# Patient Record
Sex: FEMALE | Race: WHITE | NOT HISPANIC OR LATINO | URBAN - METROPOLITAN AREA
[De-identification: names, ages, dates, MRNs, and addresses within clinical notes are randomized per-mention and may not be internally consistent; named-entity substitution may affect disease eponyms.]

---

## 2019-05-06 ENCOUNTER — IMPORTED ENCOUNTER (OUTPATIENT)
Dept: URBAN - METROPOLITAN AREA CLINIC 50 | Facility: CLINIC | Age: 49
End: 2019-05-06

## 2019-05-10 ENCOUNTER — IMPORTED ENCOUNTER (OUTPATIENT)
Dept: URBAN - METROPOLITAN AREA CLINIC 50 | Facility: CLINIC | Age: 49
End: 2019-05-10

## 2019-06-06 ENCOUNTER — IMPORTED ENCOUNTER (OUTPATIENT)
Dept: URBAN - METROPOLITAN AREA CLINIC 50 | Facility: CLINIC | Age: 49
End: 2019-06-06

## 2021-04-18 ASSESSMENT — PACHYMETRY
OD_CT_UM: 573
OS_CT_UM: 591

## 2021-04-18 ASSESSMENT — VISUAL ACUITY
OD_CC: 20/20
OS_CC: J1
OS_CC: 20/25
OD_CC: J1

## 2021-04-18 ASSESSMENT — TONOMETRY
OS_IOP_MMHG: 14
OD_IOP_MMHG: 15

## 2021-06-11 ENCOUNTER — PREPPED CHART (OUTPATIENT)
Dept: URBAN - METROPOLITAN AREA CLINIC 49 | Facility: CLINIC | Age: 51
End: 2021-06-11

## 2021-08-18 ENCOUNTER — ROUTINE EXAM (OUTPATIENT)
Dept: URBAN - METROPOLITAN AREA CLINIC 48 | Facility: CLINIC | Age: 51
End: 2021-08-18

## 2021-08-18 DIAGNOSIS — H40.013: ICD-10-CM

## 2021-08-18 DIAGNOSIS — Z01.01: ICD-10-CM

## 2021-08-18 DIAGNOSIS — H25.13: ICD-10-CM

## 2021-08-18 DIAGNOSIS — H52.13: ICD-10-CM

## 2021-08-18 PROCEDURE — 92015 DETERMINE REFRACTIVE STATE: CPT

## 2021-08-18 PROCEDURE — 92133 CPTRZD OPH DX IMG PST SGM ON: CPT

## 2021-08-18 PROCEDURE — 92014 COMPRE OPH EXAM EST PT 1/>: CPT

## 2021-08-18 ASSESSMENT — VISUAL ACUITY
OU_CC: J1+
OS_GLARE: 20/60
OS_CC: 20/20-2
OD_CC: 20/25
OD_GLARE: 20/60
OS_GLARE: 20/200
OU_CC: 20/20-1
OD_GLARE: 20/200

## 2021-08-18 ASSESSMENT — TONOMETRY
OD_IOP_MMHG: 15
OS_IOP_MMHG: 14
OD_IOP_MMHG: 16
OS_IOP_MMHG: 16

## 2021-08-18 ASSESSMENT — KERATOMETRY
OD_K2POWER_DIOPTERS: 43.50
OS_K1POWER_DIOPTERS: 44.50
OS_AXISANGLE2_DEGREES: 35
OD_AXISANGLE2_DEGREES: 145
OD_K1POWER_DIOPTERS: 43.75
OS_AXISANGLE_DEGREES: 125
OD_AXISANGLE_DEGREES: 055
OS_K2POWER_DIOPTERS: 44.00

## 2021-08-18 NOTE — PATIENT DISCUSSION
Per mod CDs OU. IOP 16/16. OCT RNFL today wnl OD/OS. No need for drop therapy at this time. Will continue to monitor yearly.

## 2023-06-02 ENCOUNTER — COMPREHENSIVE EXAM (OUTPATIENT)
Dept: URBAN - METROPOLITAN AREA CLINIC 49 | Facility: CLINIC | Age: 53
End: 2023-06-02

## 2023-06-02 DIAGNOSIS — H52.4: ICD-10-CM

## 2023-06-02 DIAGNOSIS — H25.13: ICD-10-CM

## 2023-06-02 DIAGNOSIS — Z01.01: ICD-10-CM

## 2023-06-02 PROCEDURE — 92014 COMPRE OPH EXAM EST PT 1/>: CPT

## 2023-06-02 PROCEDURE — 92015 DETERMINE REFRACTIVE STATE: CPT

## 2023-06-02 ASSESSMENT — KERATOMETRY
OD_K2POWER_DIOPTERS: 43.50
OS_AXISANGLE2_DEGREES: 35
OS_K1POWER_DIOPTERS: 44.50
OD_AXISANGLE2_DEGREES: 91
OD_K1POWER_DIOPTERS: 43.75
OS_AXISANGLE2_DEGREES: 125
OS_K1POWER_DIOPTERS: 44.25
OD_AXISANGLE_DEGREES: 001
OD_AXISANGLE2_DEGREES: 145
OD_K2POWER_DIOPTERS: 44.00
OS_K2POWER_DIOPTERS: 44.00
OD_AXISANGLE_DEGREES: 055
OS_K2POWER_DIOPTERS: 44.75
OS_AXISANGLE_DEGREES: 035
OS_AXISANGLE_DEGREES: 125

## 2023-06-02 ASSESSMENT — VISUAL ACUITY
OD_GLARE: 20/20
OU_CC: 20/20
OD_GLARE: 20/20
OU_CC: J1+@14"
OS_CC: 20/20
OS_GLARE: 20/20
OD_CC: 20/20
OS_GLARE: 20/20

## 2023-06-02 ASSESSMENT — TONOMETRY
OS_IOP_MMHG: 16
OD_IOP_MMHG: 16